# Patient Record
Sex: MALE | Race: WHITE | URBAN - METROPOLITAN AREA
[De-identification: names, ages, dates, MRNs, and addresses within clinical notes are randomized per-mention and may not be internally consistent; named-entity substitution may affect disease eponyms.]

---

## 2022-04-22 ENCOUNTER — REFRACTIVE (OUTPATIENT)
Dept: URBAN - METROPOLITAN AREA CLINIC 37 | Facility: CLINIC | Age: 22
End: 2022-04-22

## 2022-04-22 DIAGNOSIS — H52.13 MYOPIA, BILATERAL: Primary | ICD-10-CM

## 2022-04-22 ASSESSMENT — INTRAOCULAR PRESSURE
OD: 19
OS: 17

## 2022-04-22 ASSESSMENT — KERATOMETRY
OD: 44.25
OS: 44.20

## 2022-04-22 ASSESSMENT — VISUAL ACUITY
OD: 20/20
OS: 20/20

## 2022-05-20 ENCOUNTER — ADULT PHYSICAL (OUTPATIENT)
Dept: URBAN - METROPOLITAN AREA CLINIC 10 | Facility: CLINIC | Age: 22
End: 2022-05-20

## 2022-05-20 ENCOUNTER — OFFICE VISIT (OUTPATIENT)
Dept: URBAN - METROPOLITAN AREA CLINIC 10 | Facility: CLINIC | Age: 22
End: 2022-05-20

## 2022-05-20 ENCOUNTER — OFFICE VISIT (OUTPATIENT)
Dept: URBAN - METROPOLITAN AREA CLINIC 37 | Facility: CLINIC | Age: 22
End: 2022-05-20

## 2022-05-20 DIAGNOSIS — H52.13 MYOPIA, BILATERAL: Primary | ICD-10-CM

## 2022-05-20 DIAGNOSIS — H52.203 BILATERAL ASTIGMATISM: ICD-10-CM

## 2022-05-20 DIAGNOSIS — Z01.818 ENCOUNTER FOR OTHER PREPROCEDURAL EXAMINATION: Primary | ICD-10-CM

## 2022-05-20 PROCEDURE — 99202 OFFICE O/P NEW SF 15 MIN: CPT | Performed by: PHYSICIAN ASSISTANT

## 2022-05-20 PROCEDURE — 92134 CPTRZ OPH DX IMG PST SGM RTA: CPT | Performed by: OPHTHALMOLOGY

## 2022-05-20 RX ORDER — OFLOXACIN 3 MG/ML
0.3 % SOLUTION/ DROPS OPHTHALMIC
Qty: 5 | Refills: 0 | Status: ACTIVE
Start: 2022-05-20

## 2022-05-20 RX ORDER — PREDNISOLONE ACETATE 10 MG/ML
1 % SUSPENSION/ DROPS OPHTHALMIC
Qty: 5 | Refills: 0 | Status: ACTIVE
Start: 2022-05-20

## 2022-05-20 RX ORDER — OFLOXACIN 3 MG/ML
0.3 % SOLUTION/ DROPS OPHTHALMIC
Qty: 5 | Refills: 0 | Status: INACTIVE
Start: 2022-05-20 | End: 2022-05-20

## 2022-05-20 RX ORDER — PREDNISOLONE ACETATE 10 MG/ML
1 % SUSPENSION/ DROPS OPHTHALMIC
Qty: 5 | Refills: 0 | Status: INACTIVE
Start: 2022-05-20 | End: 2022-05-20

## 2022-05-20 ASSESSMENT — INTRAOCULAR PRESSURE
OS: 15
OD: 17
OS: 15
OD: 17
OS: 15
OD: 17

## 2022-05-20 NOTE — IMPRESSION/PLAN
Impression: Myopia, bilateral: H52.13. Plan: Discussed ICL surgery with patient. Discussed risks, benefits and alternatives to surgery including but not limited to: bleeding, infection, risk of vision loss, loss of the eye, need for other surgery. Patient voiced understanding and wishes to proceed. Patient elects surgical treatment.  Patient desires Surgery OU ((  AIM -0.25 OU: Topical Anesthesia, Toric ICL's OU, NOMO 12.6 )) Discussed  5% of needing a rotation of Toric IOL and still may need more surgery such as Lasik/PRK

## 2022-05-20 NOTE — IMPRESSION/PLAN
Impression: Myopia, bilateral: H52.13.RRE Plan: Discussed ICL surgery with patient. RRE -   Explained at length and reviewed with patient the R/b/a of retinal tear or detachment, including the baseline 1-2% estimated lifetime risk of retinal tear or detachment irrespective of any surgery in myopic pts. URGED pt to return immediately for retinal evaluation if any symptoms noted (floaters, photopsias, or visual field loss). Understood. Desires proceed w surgery. OK proceed. RETINA PRN.

## 2022-06-08 ENCOUNTER — SURGERY (OUTPATIENT)
Dept: URBAN - METROPOLITAN AREA SURGERY 12 | Facility: SURGERY | Age: 22
End: 2022-06-08

## 2022-06-08 PROCEDURE — SURGI PHAKIC IOL SURGEON'S FEE: CUSTOM | Performed by: OPHTHALMOLOGY

## 2022-06-09 ENCOUNTER — POST-OPERATIVE VISIT (OUTPATIENT)
Dept: URBAN - METROPOLITAN AREA CLINIC 37 | Facility: CLINIC | Age: 22
End: 2022-06-09

## 2022-06-09 DIAGNOSIS — Z48.810 ENCOUNTER FOR SURGICAL AFTERCARE FOLLOWING SURGERY ON A SENSE ORGAN: Primary | ICD-10-CM

## 2022-06-09 PROCEDURE — 99024 POSTOP FOLLOW-UP VISIT: CPT | Performed by: OPTOMETRIST

## 2022-06-09 ASSESSMENT — INTRAOCULAR PRESSURE
OS: 11
OD: 13

## 2022-06-09 NOTE — IMPRESSION/PLAN
Impression: S/P Intraocular corrective lens placement OD - 1 Day. Encounter for surgical aftercare following surgery on a sense organ  Z48.810.  Plan: use gtts as directed, Rtc 1 week

## 2022-06-15 ENCOUNTER — POST-OPERATIVE VISIT (OUTPATIENT)
Dept: URBAN - METROPOLITAN AREA CLINIC 37 | Facility: CLINIC | Age: 22
End: 2022-06-15

## 2022-06-15 DIAGNOSIS — Z48.810 ENCOUNTER FOR SURGICAL AFTERCARE FOLLOWING SURGERY ON A SENSE ORGAN: Primary | ICD-10-CM

## 2022-06-15 PROCEDURE — 99024 POSTOP FOLLOW-UP VISIT: CPT | Performed by: OPTOMETRIST

## 2022-06-15 ASSESSMENT — INTRAOCULAR PRESSURE
OD: 19
OS: 18

## 2022-06-15 NOTE — IMPRESSION/PLAN
Impression: S/P Intraocular corrective lens placement OD - 7 Days. Encounter for surgical aftercare following surgery on a sense organ  Z48.810. Plan: rtc 3 weeks.

## 2022-07-14 ENCOUNTER — POST-OPERATIVE VISIT (OUTPATIENT)
Dept: URBAN - METROPOLITAN AREA CLINIC 37 | Facility: CLINIC | Age: 22
End: 2022-07-14

## 2022-07-14 DIAGNOSIS — Z48.810 ENCOUNTER FOR SURGICAL AFTERCARE FOLLOWING SURGERY ON A SENSE ORGAN: Primary | ICD-10-CM

## 2022-07-14 PROCEDURE — 99024 POSTOP FOLLOW-UP VISIT: CPT | Performed by: OPTOMETRIST

## 2022-07-14 ASSESSMENT — INTRAOCULAR PRESSURE
OD: 14
OS: 14

## 2022-09-15 ENCOUNTER — POST-OPERATIVE VISIT (OUTPATIENT)
Dept: URBAN - METROPOLITAN AREA CLINIC 37 | Facility: CLINIC | Age: 22
End: 2022-09-15

## 2022-09-15 DIAGNOSIS — Z48.810 ENCOUNTER FOR SURGICAL AFTERCARE FOLLOWING SURGERY ON A SENSE ORGAN: Primary | ICD-10-CM

## 2022-09-15 PROCEDURE — 99024 POSTOP FOLLOW-UP VISIT: CPT | Performed by: OPTOMETRIST

## 2022-09-15 ASSESSMENT — INTRAOCULAR PRESSURE
OD: 14
OS: 14

## 2022-09-15 NOTE — IMPRESSION/PLAN
Impression: S/P Intraocular corrective lens placement OD - 99 Days. Encounter for surgical aftercare following surgery on a sense organ  Z48.810.  Plan: rtc 6/23 for CE

## 2023-01-19 NOTE — IMPRESSION/PLAN
Impression: S/P Intraocular corrective lens placement OS - 36 Days. Encounter for surgical aftercare following surgery on a sense organ  Z48.810.  Plan: rtc 2 months
no

## 2023-06-20 ENCOUNTER — OFFICE VISIT (OUTPATIENT)
Dept: URBAN - METROPOLITAN AREA CLINIC 30 | Facility: CLINIC | Age: 23
End: 2023-06-20
Payer: COMMERCIAL

## 2023-06-20 DIAGNOSIS — H43.393 OTHER VITREOUS OPACITIES, BILATERAL: Primary | ICD-10-CM

## 2023-06-20 DIAGNOSIS — Z98.890 OTHER SPECIFIED POSTPROCEDURAL STATES: ICD-10-CM

## 2023-06-20 PROCEDURE — 99213 OFFICE O/P EST LOW 20 MIN: CPT | Performed by: OPTOMETRIST

## 2023-06-20 ASSESSMENT — INTRAOCULAR PRESSURE
OD: 14
OS: 13

## 2023-06-20 ASSESSMENT — KERATOMETRY
OD: 44.22
OS: 44.14

## 2023-06-20 ASSESSMENT — VISUAL ACUITY
OS: 20/20
OD: 20/20

## 2023-06-20 NOTE — IMPRESSION/PLAN
Impression: Other specified postprocedural states: Z98.890. Plan: S/P RLE OU.  Monitor q2 years after next 1 year CE.

## 2025-06-17 ENCOUNTER — OFFICE VISIT (OUTPATIENT)
Dept: URBAN - METROPOLITAN AREA CLINIC 30 | Facility: CLINIC | Age: 25
End: 2025-06-17
Payer: COMMERCIAL

## 2025-06-17 DIAGNOSIS — Z98.890 OTHER SPECIFIED POSTPROCEDURAL STATES: ICD-10-CM

## 2025-06-17 DIAGNOSIS — H43.393 OTHER VITREOUS OPACITIES, BILATERAL: Primary | ICD-10-CM

## 2025-06-17 PROCEDURE — 92134 CPTRZ OPH DX IMG PST SGM RTA: CPT | Performed by: OPTOMETRIST

## 2025-06-17 PROCEDURE — 99213 OFFICE O/P EST LOW 20 MIN: CPT | Performed by: OPTOMETRIST

## 2025-06-17 ASSESSMENT — KERATOMETRY
OS: 43.75
OD: 43.88

## 2025-06-17 ASSESSMENT — INTRAOCULAR PRESSURE
OS: 15
OD: 15

## 2025-06-17 ASSESSMENT — VISUAL ACUITY
OD: 20/20
OS: 20/20